# Patient Record
(demographics unavailable — no encounter records)

---

## 2025-03-19 NOTE — PROCEDURE
[None] : none [Flexible Endoscope] : examined with the flexible endoscope [Normal] : posterior cricoid area had healthy pink mucosa in the interarytenoid area and the esophageal inlet [de-identified] : nasal polyps oral crusting that was suctioned

## 2025-03-19 NOTE — HISTORY OF PRESENT ILLNESS
[de-identified] : 72-year-old male presents for an initial evaluation c/o dysphagia. Had MBSS which showed severe pharyngeal dysphagia due to edema.  Reports for the last 2 months patient has had trouble swallowing, preventing him from eating. Reports patient has a peg in his stomach. Lost 20 lbs since December. Denies hoarseness or coughing. Mentions he had a stroke in December. C/o of mouth breathing, hx of nasal sx as a child. Sees speech therapy, CTA Head 1/23/25. Accompanied by family member. Daughter present.

## 2025-03-19 NOTE — PHYSICAL EXAM
[Normal] : mucosa is normal [Midline] : trachea located in midline position [de-identified] : neck swelling [de-identified] :  B/L ear cerumen removed with curette. Well tolerated. Reports improvement of clogged symptoms.

## 2025-03-19 NOTE — DATA REVIEWED
[de-identified] :   ACC: [id number] EXAM: CT ANGIO NECK STROKE PROTCL IC ORDERED BY: JAIME LACY  ACC: [id number] EXAM: CT ANGIO BRAIN STROKE PROTC IC ORDERED BY: JAIME LACY  ACC: [id number] EXAM: CT BRAIN PERFUSION MAPS STROKE ORDERED BY: JAIME LACY  PROCEDURE DATE: 01/23/2025    INTERPRETATION: CLINICAL INDICATION: Stroke code. Dysarthria.  TECHNIQUE: CT perfusion of the brain was performed following the bolus administration of intravenous contrast. Source images were postprocessed on an independent workstation under direct physician supervision and archived to the PACS. CTA of the head and neck was performed after the intravenous administration of Omnipaque 350 nonionic IV contrast. 3-D and MIP reconstructions were performed and reviewed. Contrast: A total of 50 mL of Omnipaque 350 was administered intravenously.  NASCET CRITERIA FOR CAROTID STENOSIS: Mild: 0% to 49%, Moderate: 50% to 69%, Severe: 70% to 99%, Complete Occlusion.  COMPARISON: CT neck from 1/10/2025. Correlate with MRA of the head dated 1/19/2025.  FINDINGS:  CT PERFUSION:  CBF<30%: 0 mL Tmax>6sec: 0 mL Mismatch volume: 0 mL Mismatch ratio: None  Per RAPID assessment for acute infarct, threshold for ischemic tissue volume is Tmax > 6 sec. Threshold for infarct core volume estimate is CBF < 30 percent. Threshold for poor collateral flow or severe delayed perfusion is Tmax > 10 sec.   CTA HEAD/NECK:  AORTIC ARCH: Mild atherosclerotic plaques without flow-limiting stenosis.  RIGHT ANTERIOR CIRCULATION: Common carotid artery: No stenosis. External carotid artery: No stenosis. Internal carotid artery: -Extracranial: Mild mixed plaques in the carotid bulb extending to the proximal cervical ICA resulting up to mild stenosis in the proximal cervical ICA (20%). Additional mild stenosis in the distal cervical ICA due to calcific plaque (40%). -Intracranial: Moderate calcific atherosclerosis in the carotid siphon without stenosis.  Anterior cerebral artery: No stenosis. Middle cerebral artery: No stenosis.   LEFT ANTERIOR CIRCULATION: Common carotid artery: Mild stenosis in the distal segment due to noncalcific plaque.. External carotid artery: No stenosis. Internal carotid artery: -Extracranial: Moderate mixed plaque in the carotid bulb extending to the proximal left cervical ICA resulting in short segment severe stenosis proximally (80-90 %). -Intracranial: Mild diffuse calcific atherosclerosis in the carotid siphon without stenosis.  Anterior cerebral artery: No stenosis. Stable hypoplastic A1 segment. Middle cerebral artery: No stenosis.   POSTERIOR CIRCULATION: Right vertebral artery: Severe stenosis due to calcified plaque at the ostium. Mild calcified plaque in the V3 and V4 segments without stenosis. Left vertebral artery: Focal severe stenosis in the ostium due to calcific plaque. Focal moderate stenosis in the V4 segment due to calcific plaque. Basilar artery: No stenosis. Proximal cerebellar arteries: No stenosis.  Posterior cerebral arteries: No stenosis. Hypoplastic posterior communicating arteries.  Dural venous sinuses or deep cerebral veins: No filling defects of the dural venous sinuses or deep cerebral veins.  NONVASCULAR FINDINGS: Please see separately dictated CT head for the intracranial findings. Stable compression deformities from C6 at the T3. Stable prominent anteriorly bulging osteophytes, most prominent at C3-C4. Slightly decreased retropharyngeal edema compared to the noncontrast CT neck from 1/10/2025. There is nonspecific mucosal enhancement of the oropharynx and submucosal edema without definite evidence of soft tissue mass. Tree-in-bud opacification in the right lower lobe with 1.3 cm nodule in (2-8). Areas of pleural thickening in the bilateral apices   IMPRESSION:  CT PERFUSION: No perfusion deficits to suggest areas of completed infarction or at risk territory.  CTA HEAD/NECK: Short segment atherosclerotic severe stenosis in the proximal left cervical ICA (80-90%).  Multifocal mild stenosis in the right cervical ICA (up to 40%).  Focal severe atherosclerotic stenosis in the ostium of the bilateral vertebral arteries, and moderate stenosis in the V4 segment of the left vertebral artery.  Slightly decreased retropharyngeal edema compared to the noncontrast CT neck from 1/10/2025. Nonspecific mucosal enhancement of the oropharynx with submucosal edema. No evidence of soft tissue mass. Direct visual inspection should be considered as clinically indicated.  Tree in bud opacification with 1.3 cm nodule in the in the partially imaged right lower lobe. Recommend dedicated CT of the chest.  --- End of Report ---     BONNIE GUADALUPE MD; Resident Radiologist This document has been electronically signed. ALLA ARRIAGA MD; Attending Radiologist This document has been electronically signed. Jan 23 2025 8:53PM

## 2025-04-09 NOTE — ASSESSMENT
[FreeTextEntry1] : This is a 72-year-old male who presents for neurosurgical evaluation.  As mentioned above, he suffered a CVA in late December/early January 2025.  At that time he had presented with dysphagia and upon further testing was revealed to have severe DISH/anterior osteophyte formation most impressive at C3-4.  We have discussed at length to the daughter that this is likely not an acute finding but something that has progressed over time.  He was able to compensate for this up until his CVA and now he is experiencing the above-mentioned dysphagia symptoms with intolerance to even thin liquids.  He continues to receive nutrition via PEG tube placement and remains at a subacute rehab facility.  We have discussed that it would not be ideal for him to undergo anterior decompression.  This is an extensive procedure which would not be advisable in the setting of an acute CVA.  Additionally, we cannot reassure patient that this would be effective as he has several components leading to his difficulty swallowing.  Due to the high risk nature of the procedure the patient's daughter is also agreeable to postpone such efforts at this time.  They will work closely with stroke neurology for management of his condition.  He will remain within the rehab facility to improve his overall activity and function.  At request of daughter we have ordered a modified barium swallow.  The results of this should be followed by his speech and language professional.  They can elect to return to us over the next 6+ months.  PRETTY Hernandez MD

## 2025-04-09 NOTE — HISTORY OF PRESENT ILLNESS
[de-identified] : Daughter, Jeny- serves as .  This is a 73 yo M who presents for hospital discharge follow-up.  As review, patient underwent total hip replacement on December 30, 2024.  He was referred back to the emergency department on January 8, 2025 from the rehab facility with regards to weakness and difficulty swallowing with reduced participation in physical therapy as of late.  Patient was found to have an infarct involving the left paramedian aspect of the brielle.  Dysphagia workup completed with evidence of severe DISH/anterior osteophyte formation most prominent at C3-4.  There is evidence of tissue edema extending C3-7 with mass effect of a nonspecific etiology.  Patient was given Decadron with low threshold for intubation.  He later developed septicemia with chest x-ray showing basilar opacity and given Zosyn during his admission.  ENT evaluation with scope revealed evidence of pharyngeal edema with thin purulent secretions.  He failed p.o. intake with SLP.  Could not tolerate ice chips.  With modified barium swallow study revealing severe pharyngeal dysphagia.  He was deemed not an adequate candidate for p.o. intake therefore NG tube placed with later PEG tube placement by GI on January 22, 2025.  Patient was found to be stable and discharged to home.  On January 28, 2025 he was readmitted to [facility] for fever of 103 secondary to bacteremia started on ampicillin with transition to amoxicillin 875 via PEG tube.  Condition stabilized and patient once again able to be discharged to outpatient rehab facility.  He presents at this time for evaluation, daughter present via telephone- Jeny.  She expresses concern with regards to his dysphagia and questions the role of his severe DISH/anterior osteophyte formation.  We have explained to the daughter at length that such findings have been present for likely quite some time and patient developed compensatory mechanisms to aid with swallowing.  She reports that he has always been a "slow eater ".  The CVA likely inhibited his compensatory mechanisms leading to his severe dysphagia.    IMAGING MR  C spine 3/2025--  1. Significant decreased prevertebral edema since prior studies 2. Cervical canal generalized stenosis, likely congenital 3. Degenerative changes as described 4. Anterior bridging osteophytosis, 3 and 4 levels consistent with DISH  CT PERFUSION: No perfusion deficits to suggest areas of completed infarction or at risk territory.  CTA HEAD/NECK: Short segment atherosclerotic severe stenosis in the proximal left cervical ICA (80-90%).  Multifocal mild stenosis in the right cervical ICA (up to 40%).  Focal severe atherosclerotic stenosis in the ostium of the bilateral vertebral arteries, and moderate stenosis in the V4 segment of the left vertebral artery.  Slightly decreased retropharyngeal edema compared to the noncontrast CT neck from 1/10/2025. Nonspecific mucosal enhancement of the oropharynx with submucosal edema. No evidence of soft tissue mass. Direct visual inspection should be considered as clinically indicated.  Tree in bud opacification with 1.3 cm nodule in the in the partially imaged right lower lobe. Recommend dedicated CT of the chest.  PHYSICAL EXAM:   Constitutional: No evidence of distress HEENT: Normocephalic Atraumatic Psychiatric: Alert and oriented to all spheres, normal mood Pulmonary: No respiratory distress  Neurologic:  CN II-XII grossly intact Palpation: no pain to palpation   ROM limited In wheelchair

## 2025-04-30 NOTE — PHYSICAL EXAM
[Nasal Endoscopy Performed] : nasal endoscopy was performed, see procedure section for findings [Normal] : mucosa is normal [Midline] : trachea located in midline position [de-identified] : neck swelling

## 2025-04-30 NOTE — HISTORY OF PRESENT ILLNESS
[FreeTextEntry1] : Pt returns today c/o dysphagia, mouth breathing, mouth breathing, nasal polyps, sensation of plugged ears. Accompanied by daughter in law. States that he used Fluticasone, Prednisone with some improvement. Recently had some mouth bleeding when he was trying to clean his mouth. Has PEG tube. Previous history of stroke. Has not gone for speech therapy.

## 2025-04-30 NOTE — REASON FOR VISIT
[Subsequent Evaluation] : a subsequent evaluation for [FreeTextEntry2] : dysphagia, mouth breathing, mouth bleeding, nasal polyps, sensation of plugged ears

## 2025-04-30 NOTE — PROCEDURE
[___ cm] : bilateral [unfilled]Ucm polyp(s) [de-identified] : polyps  [FreeTextEntry6] : The following anatomic sites were directly examined in a sequential fashion: The scope was introduced in the nasal passage between the middle and inferior turbinates to exam the inferior portion of the middle meatus and the fontanelle, as well as the maxillary ostia. Next, the scope was passed medically and posteriorly to the middle turbinates to examine the sphenoethmoid recess and the superior turbinate region. turbinate hypertrophy

## 2025-06-25 NOTE — DISCUSSION/SUMMARY
[FreeTextEntry1] : Pt is a 71 yo M with PMhx of HTN, HLD, DM, DVT/PE on eliquis, hip surgery s/p fall/fracture 12/2024 ([facility]), who presents for hospital f/u.   # left pontine ischemic stroke: likely occurred sometime in early January given timeline of symptoms, granted looked late subacute to chronic on MRI. CTA head/neck with 80-90% prox L ICA stenosis, 40% R ICA stenosis, focal severe stenosis of b/l VA ostium and moderate of L V4. S/p PEG tube placement for dysphagia. NIHSS 0, mRS 4. - Continue eliquis/plavix as per primary/Cardiology - Continue statin - Agree with ST eval/management - Upcoming Cardiology appt, consider vascular f/u as well for asymptomatic severe L ICA stenosis - Continue PT  # Parkinsonism: mild bradykinesia and rigidity in LUE, with falls/imbalance - Hussain scan brain  -Fall precautions  RTC in 4 mo

## 2025-06-25 NOTE — PHYSICAL EXAM
[General Appearance - Alert] : alert [General Appearance - In No Acute Distress] : in no acute distress [General Appearance - Well Nourished] : well nourished [General Appearance - Well Developed] : well developed [Oriented To Time, Place, And Person] : oriented to person, place, and time [Affect] : the affect was normal [Person] : oriented to person [Place] : oriented to place [Time] : oriented to time [Naming Objects] : no difficulty naming common objects [Fluency] : fluency intact [Comprehension] : comprehension intact [Cranial Nerves Optic (II)] : visual acuity intact bilaterally,  visual fields full to confrontation, pupils equal round and reactive to light [Cranial Nerves Oculomotor (III)] : extraocular motion intact [Cranial Nerves Trigeminal (V)] : facial sensation intact symmetrically [Cranial Nerves Facial (VII)] : face symmetrical [Cranial Nerves Vestibulocochlear (VIII)] : hearing was intact bilaterally [Cranial Nerves Hypoglossal (XII)] : there was no tongue deviation with protrusion [Cranial Nerves Vagus Uvula Deviated To Left] : uvula was deviated to the left [Motor Strength] : muscle strength was normal in all four extremities [Sensation Tactile Decrease] : light touch was intact [Tremor] : no tremor present [Coordination - Dysmetria Impaired Finger-to-Nose Bilateral] : not present [FreeTextEntry6] : mild rigidity in LUE as compared to the right [FreeTextEntry8] : Slightly stooped posture, normal stance, decreased arm swing on the left, slow stride, bradykinesia with finger tapping

## 2025-06-25 NOTE — HISTORY OF PRESENT ILLNESS
[FreeTextEntry1] : Pt is a 71 yo M with PMhx of HTN, HLD, DM, DVT/PE on eliquis, hip surgery s/p fall/fracture 12/2024 ([facility]), who presents for hospital f/u. Pt presented to [facility] on 1/8/25 with dysphagia, dysarthria since going to rehab post hip surgery. Unclear whether how long eliquis was held. MRI brain didn't show an acute infarct, but MRI brain was suggestive of subacute/chronic infarct in left brielle, which was thought to potentially be the cause. Pt also had MRI cervical spine with prevertebral edema, anterior bridging osteophytosis, 3-4, with DISH. Pt had PEG tub placed. He continues to have swallowing difficulty, plan for ST f/u and barium swallow. He ambulates with cane/walker, denies falls since December. Walks slowly (was walking slow prior to fall as well), needs help with ADL's. Has lost weight due to dysphagia. Denies worsening in evening. Denies tremor or light headedness. Denies increased movement/vocalization in his sleep. He was kept on eliquis for clot prevention due to limited mobility.

## 2025-07-03 NOTE — ASSESSMENT
[FreeTextEntry1] : 72 year old man with HTN, HLD, DM, DVT/PE on Eliquis and history of CVA who presents to establish care.  1. DVT/PE: In hospital. Daughter did not know of this diagnosis. The plan with Dr. Perry, his former Cardiologist who had followed him for years was to take Eliquis for 6 months and then repeat CT scan. If there was no PE, Eliquis could be stopped.   2. HTN: BP at goal today. His goal is <130/80 mmHg.  - Continue Amlodipine 5mg daily - Continue Cardura 2mg daily - Echocardiogram   3. Stroke: following with Neurology. - Continue Plavix (he is on Eliquis so no Aspirin) - Continue Atorvastatin 80mg daily; LDL is at goal of <70  4. L ICA stenosis: gave Vascular Surgery referral.   The secondary prevention of heart disease was discussed in detail with the patient, including adhering to a heart healthy, plant based, or Mediterranean diet, and the importance of 30 minutes of moderate intensity activity for 30 minutes, 5 times a week. All the patient's questions were answered.   RTC in 3 months.

## 2025-07-03 NOTE — HISTORY OF PRESENT ILLNESS
[FreeTextEntry1] : EFREN HENDERSON is a 72 year old man with HTN, HLD, DM, DVT/PE on Eliquis and history of CVA who presents to establish care. He was a patient of Dr. Perry's.   The patient denies chest pain, shortness of breath, palpitations and syncope. No leg swelling, orthopnea and PND.  I spoke to Dr. Perry. He had a PE when he was in the hospital and the plan was to keep him on Eliquis for 6 months and then re-evaluate with a CT scan. If no PE, Eliquis can be stopped.   He also saw Vascular surgery in hospital and he has high grade L ICA stenosis.

## 2025-07-15 NOTE — ASSESSMENT
[FreeTextEntry1] : 71 y/o M with h/o DM, stroke in December 2025, dysphagia secondary to cervical spine disease, underwent PEG placement, presents for carotid artery stenosis.  Duplex showed 50% right carotid artery stenosis and >70% left carotid artery stenosis. CTA neck reviewed.  He is still recovering and will f/u in 3 months to discuss surgical treatment.  I, Dr. Forrest Zambrano, personally performed the evaluation and management (E/M) services for this new patient. That E/M includes conducting the clinically appropriate initial history &/or exam, assessing all conditions, and establishing the plan of care. Today, my MARY, Aide Bello PA-C, was here to observe my evaluation and management service for this patient & follow plan of care established by me going forward.

## 2025-07-15 NOTE — DATA REVIEWED
[FreeTextEntry1] : I performed a carotid duplex which was medically necessary to evaluate for carotid stenosis. It showed 50% right carotid artery stenosis and >70% left carotid artery stenosis.

## 2025-07-15 NOTE — HISTORY OF PRESENT ILLNESS
[FreeTextEntry1] : 73 y/o M with h/o DM, stroke in December 2025, dysphagia secondary to cervical spine disease, underwent PEG placement, presents for carotid artery stenosis.